# Patient Record
Sex: FEMALE | Race: WHITE | Employment: FULL TIME | URBAN - METROPOLITAN AREA
[De-identification: names, ages, dates, MRNs, and addresses within clinical notes are randomized per-mention and may not be internally consistent; named-entity substitution may affect disease eponyms.]

---

## 2018-02-09 ENCOUNTER — HOSPITAL ENCOUNTER (OUTPATIENT)
Dept: PREADMISSION TESTING | Age: 43
Discharge: HOME | End: 2018-02-09
Payer: MEDICAID

## 2018-02-09 LAB
ABO + RH BLD: NORMAL
APPEARANCE UR: CLEAR
BACTERIA URNS QL MICRO: ABNORMAL /HPF
BASOPHILS # BLD: 0 K/UL (ref 0–0.06)
BASOPHILS NFR BLD: 0 % (ref 0–2)
BILIRUB UR QL: NEGATIVE
BLOOD GROUP ANTIBODIES SERPL: NORMAL
COLOR UR: YELLOW
DIFFERENTIAL METHOD BLD: ABNORMAL
EOSINOPHIL # BLD: 0.1 K/UL (ref 0–0.4)
EOSINOPHIL NFR BLD: 3 % (ref 0–5)
EPITH CASTS URNS QL MICRO: ABNORMAL /LPF (ref 0–5)
ERYTHROCYTE [DISTWIDTH] IN BLOOD BY AUTOMATED COUNT: 12.5 % (ref 11.6–14.5)
GLUCOSE UR STRIP.AUTO-MCNC: NEGATIVE MG/DL
HCT VFR BLD AUTO: 40.8 % (ref 35–45)
HGB BLD-MCNC: 14 G/DL (ref 12–16)
HGB UR QL STRIP: NEGATIVE
KETONES UR QL STRIP.AUTO: NEGATIVE MG/DL
LEUKOCYTE ESTERASE UR QL STRIP.AUTO: ABNORMAL
LYMPHOCYTES # BLD: 1.6 K/UL (ref 0.9–3.6)
LYMPHOCYTES NFR BLD: 40 % (ref 21–52)
MCH RBC QN AUTO: 31.5 PG (ref 24–34)
MCHC RBC AUTO-ENTMCNC: 34.3 G/DL (ref 31–37)
MCV RBC AUTO: 91.9 FL (ref 74–97)
MONOCYTES # BLD: 0.3 K/UL (ref 0.05–1.2)
MONOCYTES NFR BLD: 7 % (ref 3–10)
NEUTS SEG # BLD: 2.1 K/UL (ref 1.8–8)
NEUTS SEG NFR BLD: 50 % (ref 40–73)
NITRITE UR QL STRIP.AUTO: NEGATIVE
PH UR STRIP: 7 [PH] (ref 5–8)
PLATELET # BLD AUTO: 168 K/UL (ref 135–420)
PMV BLD AUTO: 9.8 FL (ref 9.2–11.8)
PROT UR STRIP-MCNC: NEGATIVE MG/DL
RBC # BLD AUTO: 4.44 M/UL (ref 4.2–5.3)
RBC #/AREA URNS HPF: ABNORMAL /HPF (ref 0–5)
SP GR UR REFRACTOMETRY: 1.01 (ref 1–1.03)
SPECIMEN EXP DATE BLD: NORMAL
UROBILINOGEN UR QL STRIP.AUTO: 0.2 EU/DL (ref 0.2–1)
WBC # BLD AUTO: 4 K/UL (ref 4.6–13.2)
WBC URNS QL MICRO: ABNORMAL /HPF (ref 0–5)

## 2018-02-09 PROCEDURE — 85025 COMPLETE CBC W/AUTO DIFF WBC: CPT | Performed by: OBSTETRICS & GYNECOLOGY

## 2018-02-09 PROCEDURE — 36415 COLL VENOUS BLD VENIPUNCTURE: CPT | Performed by: OBSTETRICS & GYNECOLOGY

## 2018-02-09 PROCEDURE — 86900 BLOOD TYPING SEROLOGIC ABO: CPT | Performed by: OBSTETRICS & GYNECOLOGY

## 2018-02-09 PROCEDURE — 81001 URINALYSIS AUTO W/SCOPE: CPT | Performed by: OBSTETRICS & GYNECOLOGY

## 2018-02-09 NOTE — H&P
Formerly [facility] [place]  PRE-OP HISTORY AND PHYSICAL    Michaela Salinas  MR#: [id number]  : 1975  ACCOUNT #: [de-identified]   DATE OF SERVICE: 2018    DATE AND TIME OF THIS DICTATION:  2018, approximately 11:15 a.m. She is coming in at 6:00 a.m. on 2018. CHIEF COMPLAINT:    1. Known lupus patient, stable and controlled. 2.  Dysfunctional uterine bleeding, refractory, dysmenorrhea and dyspareunia, known stage II endometriosis. HISTORY OF PRESENT ILLNESS:  The patient is a 46-year-old  4, para 2, aborta 2,  3  female with the above diagnosis. The patient previously has had diagnostic and operative laparoscopy and was preoperatively cleared as we have this time for 2016 wherein the patient was found to have stage III pelvic uterine ovary endometriosis and a fibroid uterus minimally enlarged. Pathology on the D and C specimens were all benign and as far as the uterus, it is just generally enlarged uterus. There is no sequential increase in size. The patient evaluations of all findings have all been benign and no irregularities seen on the uterus. As mentioned, the D and C specimens have all been benign. Patient has 11 cm generally enlarged uterus, but otherwise normal.  Both ovaries had superficial endometriosis taken down with the lasers and the posterior cul-de-sac endometriosis was also vaporized with laser. The patient; however, has found that she is returning to dysfunctional bleeding.   Workup at [address] [place] [address] shows her platelet counts have been totally normal.  I have talked with Dr. Berenice Byrd of [address] [place] [address] and special hematology, feels all her clotting factors and platelet counts are all normal, just with difficult sticks may cascade her vascular system which gives us a low platelet count as in their facility all platelets have come back normal.  All workups and clotting factors have also been normal.  We have also had the patient seen by Falguni Escudero, her PCP and has cleared the patient with November's evaluation as far as surgery. The patient follows her lupus with Dr. Shai Bauer who feels that she is cleared for surgery, is on Benlysta and gives her shots once a week and does not interfere with the patient's clotting factors or surgery. The patient is also on Plaquenil and it is felt by Dr. Will Evans that there is no need to stop and this will not interfere with the patient's surgical procedures or clotting factors. She was on these medications or at least one of these medicines Plaquenil at the time of her surgery in 2016. The Patient will begin on this time on Ancef, taken at prophylaxis. The patient will also most likely have removal of the left ovary and repairs to the right ovary with ovary plasty and a laparoscopic supracervical hysterectomy. We discussed the issues of the controversy over morselization of the uterus. We will also have the special permit signed but we have no anticipated irregularities to the uterus or any of the myometrial tissues. All had been consented with and patient does improve with utilization of a bag system. The patient also at the time of the laparoscopy had an anteflexed appendix was normal.  Gallbladder site was clear and clean and liver was normal.  The patient has listed in the past Melvi Valencia as her significant and attends her surgeries. OTHER SIGNIFICANT PAST MEDICAL HISTORY:  Previous history of diagnostic and operative laparoscopy with treatment of endometriosis in . The patient has a history of advance and delivery in  and a  as listed with her last pregnancy with the bladder flap scarring minimal at the time of her laparoscopy. The patient has NO KNOWN DRUG ALLERGIES. Occasionally, has a UTI not a concurrent problem. Patient's grandmother had lung cancer, other specifics not given.     The patient's evaluation with Dr. Shai Bauer has shown all renal studies to be normal and as mentioned has placed the patient on Benlysta which she gives as an injection once a week and is on Plaquenil, which has been cleared for her laparoscopy procedures. She is on 200 mg daily. Patient is on vitamin D about 1000 mg per day. Takes occasional Xanax 0.25 mg on a p.r.n. basis. Patient occasionally uses tramadol. Patient's Pap smears are all class 1 and HPV negative for greater than the last three Pap smears including 01/2018. REVIEW OF SYSTEMS:  Otherwise, noncontributory and all are normal except for given above. PHYSICAL EXAMINATION:  GENERAL:  Well-developed, well-nourished  female, 5 feet 8 inches tall. VITAL SIGNS:  Weight 199 pounds, blood pressure 131/80. HEAD,  EYES, EARS, NOSE AND THROATL:  Examination is normal.  CHEST:  Clear. BREASTS:  Without extraneous masses. HEART:  Regular sinus rhythm without murmur. ABDOMEN:  Reveals well healed laparoscopy and Pfannenstiel incision site. PELVIC:  As in the present illness.   The rest of the exam including neurologic is otherwise normal.      MD TALIA Dobson / ANNA  D: 02/06/2018 11:31     T: 02/06/2018 12:31  JOB #: [id number]

## 2018-02-13 ENCOUNTER — HOSPITAL ENCOUNTER (OUTPATIENT)
Dept: PREADMISSION TESTING | Age: 43
Discharge: HOME | End: 2018-02-13
Attending: OBSTETRICS & GYNECOLOGY
Payer: MEDICAID

## 2018-02-13 PROBLEM — N94.5 SECONDARY DYSMENORRHEA: Status: ACTIVE | Noted: 2018-02-13

## 2018-02-13 PROBLEM — N93.8 DUB (DYSFUNCTIONAL UTERINE BLEEDING): Status: ACTIVE | Noted: 2018-02-13

## 2018-02-13 LAB
ANION GAP SERPL CALC-SCNC: 9 MMOL/L (ref 3–18)
ATRIAL RATE: 97 BPM
BUN SERPL-MCNC: 6 MG/DL (ref 7–18)
BUN/CREAT SERPL: 8 (ref 12–20)
CALCIUM SERPL-MCNC: 8.9 MG/DL (ref 8.5–10.1)
CALCULATED P AXIS, ECG09: 61 DEGREES
CALCULATED R AXIS, ECG10: 59 DEGREES
CALCULATED T AXIS, ECG11: 55 DEGREES
CHLORIDE SERPL-SCNC: 108 MMOL/L (ref 100–108)
CO2 SERPL-SCNC: 28 MMOL/L (ref 21–32)
CREAT SERPL-MCNC: 0.8 MG/DL (ref 0.6–1.3)
DIAGNOSIS, 93000: NORMAL
GLUCOSE SERPL-MCNC: 82 MG/DL (ref 74–99)
P-R INTERVAL, ECG05: 164 MS
POTASSIUM SERPL-SCNC: 3.9 MMOL/L (ref 3.5–5.5)
Q-T INTERVAL, ECG07: 338 MS
QRS DURATION, ECG06: 80 MS
QTC CALCULATION (BEZET), ECG08: 429 MS
SODIUM SERPL-SCNC: 145 MMOL/L (ref 136–145)
VENTRICULAR RATE, ECG03: 97 BPM

## 2018-02-13 PROCEDURE — 80048 BASIC METABOLIC PNL TOTAL CA: CPT | Performed by: ANESTHESIOLOGY

## 2018-02-13 PROCEDURE — 93005 ELECTROCARDIOGRAM TRACING: CPT

## 2018-02-13 PROCEDURE — 36415 COLL VENOUS BLD VENIPUNCTURE: CPT | Performed by: ANESTHESIOLOGY

## 2018-02-13 NOTE — INTERVAL H&P NOTE
H&P Update: Cabrera Moreno was seen and examined. History and physical has been reviewed. The patient has been examined.  There have been no significant clinical changes since the completion of the originally dated History and Physical.    Signed By: Inessa Barker MD     February 13, 2018 10:26 AM

## 2018-02-14 ENCOUNTER — ANESTHESIA EVENT (OUTPATIENT)
Dept: SURGERY | Age: 43
End: 2018-02-14
Payer: MEDICAID

## 2018-02-14 ENCOUNTER — ANESTHESIA (OUTPATIENT)
Dept: SURGERY | Age: 43
End: 2018-02-14
Payer: MEDICAID

## 2018-02-14 ENCOUNTER — HOSPITAL ENCOUNTER (OUTPATIENT)
Age: 43
Setting detail: OBSERVATION
Discharge: HOME | End: 2018-02-15
Attending: OBSTETRICS & GYNECOLOGY | Admitting: OBSTETRICS & GYNECOLOGY
Payer: MEDICAID

## 2018-02-14 PROBLEM — N94.5 SECONDARY DYSMENORRHEA: Status: RESOLVED | Noted: 2018-02-13 | Resolved: 2018-02-14

## 2018-02-14 PROBLEM — N93.8 DUB (DYSFUNCTIONAL UTERINE BLEEDING): Status: RESOLVED | Noted: 2018-02-13 | Resolved: 2018-02-14

## 2018-02-14 PROBLEM — Z90.710 S/P HYSTERECTOMY: Status: ACTIVE | Noted: 2018-02-14

## 2018-02-14 PROBLEM — M35.00 SJOGREN'S DISEASE (HCC): Status: ACTIVE | Noted: 2018-02-14

## 2018-02-14 LAB
HCG SERPL QL: NEGATIVE
RPR SER QL: NONREACTIVE

## 2018-02-14 PROCEDURE — 76060000036 HC ANESTHESIA 2.5 TO 3 HR: Performed by: OBSTETRICS & GYNECOLOGY

## 2018-02-14 PROCEDURE — 77030008683 HC TU ET CUF COVD -A: Performed by: ANESTHESIOLOGY

## 2018-02-14 PROCEDURE — 86592 SYPHILIS TEST NON-TREP QUAL: CPT | Performed by: OBSTETRICS & GYNECOLOGY

## 2018-02-14 PROCEDURE — 77030010030: Performed by: OBSTETRICS & GYNECOLOGY

## 2018-02-14 PROCEDURE — 74011250636 HC RX REV CODE- 250/636: Performed by: ANESTHESIOLOGY

## 2018-02-14 PROCEDURE — 74011250637 HC RX REV CODE- 250/637: Performed by: OBSTETRICS & GYNECOLOGY

## 2018-02-14 PROCEDURE — 77030026092 HC FCPS ENDOSC BPLR OCOA -E: Performed by: OBSTETRICS & GYNECOLOGY

## 2018-02-14 PROCEDURE — 88331 PATH CONSLTJ SURG 1 BLK 1SPC: CPT | Performed by: OBSTETRICS & GYNECOLOGY

## 2018-02-14 PROCEDURE — 77030032490 HC SLV COMPR SCD KNE COVD -B: Performed by: OBSTETRICS & GYNECOLOGY

## 2018-02-14 PROCEDURE — 74011250636 HC RX REV CODE- 250/636: Performed by: OBSTETRICS & GYNECOLOGY

## 2018-02-14 PROCEDURE — 74011250636 HC RX REV CODE- 250/636

## 2018-02-14 PROCEDURE — 74011000250 HC RX REV CODE- 250: Performed by: OBSTETRICS & GYNECOLOGY

## 2018-02-14 PROCEDURE — C1782 MORCELLATOR: HCPCS | Performed by: OBSTETRICS & GYNECOLOGY

## 2018-02-14 PROCEDURE — 36415 COLL VENOUS BLD VENIPUNCTURE: CPT | Performed by: OBSTETRICS & GYNECOLOGY

## 2018-02-14 PROCEDURE — 99218 HC RM OBSERVATION: CPT

## 2018-02-14 PROCEDURE — 77030011294 HC FCPS BPLR MCR J&J -B: Performed by: OBSTETRICS & GYNECOLOGY

## 2018-02-14 PROCEDURE — 77030009401: Performed by: OBSTETRICS & GYNECOLOGY

## 2018-02-14 PROCEDURE — 76210000006 HC OR PH I REC 0.5 TO 1 HR: Performed by: OBSTETRICS & GYNECOLOGY

## 2018-02-14 PROCEDURE — 77030033200 HC PRT CLSR CRTR THOMP COOP -C: Performed by: OBSTETRICS & GYNECOLOGY

## 2018-02-14 PROCEDURE — 88307 TISSUE EXAM BY PATHOLOGIST: CPT | Performed by: OBSTETRICS & GYNECOLOGY

## 2018-02-14 PROCEDURE — 77030020268 HC MISC GENERAL SUPPLY: Performed by: OBSTETRICS & GYNECOLOGY

## 2018-02-14 PROCEDURE — 77030031139 HC SUT VCRL2 J&J -A: Performed by: OBSTETRICS & GYNECOLOGY

## 2018-02-14 PROCEDURE — 77030003666 HC NDL SPINAL BD -A: Performed by: OBSTETRICS & GYNECOLOGY

## 2018-02-14 PROCEDURE — 74011000250 HC RX REV CODE- 250

## 2018-02-14 PROCEDURE — C1765 ADHESION BARRIER: HCPCS | Performed by: OBSTETRICS & GYNECOLOGY

## 2018-02-14 PROCEDURE — 77030020782 HC GWN BAIR PAWS FLX 3M -B: Performed by: OBSTETRICS & GYNECOLOGY

## 2018-02-14 PROCEDURE — 77030020255 HC SOL INJ LR 1000ML BG: Performed by: OBSTETRICS & GYNECOLOGY

## 2018-02-14 PROCEDURE — 77030013079 HC BLNKT BAIR HGGR 3M -A: Performed by: ANESTHESIOLOGY

## 2018-02-14 PROCEDURE — 77030018836 HC SOL IRR NACL ICUM -A: Performed by: OBSTETRICS & GYNECOLOGY

## 2018-02-14 PROCEDURE — 77030009851 HC PCH RTVR ENDOSC AMR -B: Performed by: OBSTETRICS & GYNECOLOGY

## 2018-02-14 PROCEDURE — 77010033678 HC OXYGEN DAILY

## 2018-02-14 PROCEDURE — 76010000132 HC OR TIME 2.5 TO 3 HR: Performed by: OBSTETRICS & GYNECOLOGY

## 2018-02-14 PROCEDURE — 84703 CHORIONIC GONADOTROPIN ASSAY: CPT | Performed by: OBSTETRICS & GYNECOLOGY

## 2018-02-14 PROCEDURE — 77030008603 HC TRCR ENDOSC EPATH J&J -C: Performed by: OBSTETRICS & GYNECOLOGY

## 2018-02-14 PROCEDURE — 77030008477 HC STYL SATN SLP COVD -A: Performed by: ANESTHESIOLOGY

## 2018-02-14 PROCEDURE — 88305 TISSUE EXAM BY PATHOLOGIST: CPT | Performed by: OBSTETRICS & GYNECOLOGY

## 2018-02-14 PROCEDURE — 77030027138 HC INCENT SPIROMETER -A

## 2018-02-14 RX ORDER — SCOLOPAMINE TRANSDERMAL SYSTEM 1 MG/1
PATCH, EXTENDED RELEASE TRANSDERMAL AS NEEDED
Status: DISCONTINUED | OUTPATIENT
Start: 2018-02-14 | End: 2018-02-14 | Stop reason: HOSPADM

## 2018-02-14 RX ORDER — SODIUM CHLORIDE 0.9 % (FLUSH) 0.9 %
5-10 SYRINGE (ML) INJECTION EVERY 8 HOURS
Status: DISCONTINUED | OUTPATIENT
Start: 2018-02-14 | End: 2018-02-15 | Stop reason: HOSPADM

## 2018-02-14 RX ORDER — ROCURONIUM BROMIDE 10 MG/ML
INJECTION, SOLUTION INTRAVENOUS AS NEEDED
Status: DISCONTINUED | OUTPATIENT
Start: 2018-02-14 | End: 2018-02-14 | Stop reason: HOSPADM

## 2018-02-14 RX ORDER — FENTANYL CITRATE 50 UG/ML
INJECTION, SOLUTION INTRAMUSCULAR; INTRAVENOUS AS NEEDED
Status: DISCONTINUED | OUTPATIENT
Start: 2018-02-14 | End: 2018-02-14 | Stop reason: HOSPADM

## 2018-02-14 RX ORDER — MIDAZOLAM HYDROCHLORIDE 1 MG/ML
INJECTION, SOLUTION INTRAMUSCULAR; INTRAVENOUS AS NEEDED
Status: DISCONTINUED | OUTPATIENT
Start: 2018-02-14 | End: 2018-02-14 | Stop reason: HOSPADM

## 2018-02-14 RX ORDER — DEXTROSE 50 % IN WATER (D50W) INTRAVENOUS SYRINGE
25-50 AS NEEDED
Status: DISCONTINUED | OUTPATIENT
Start: 2018-02-14 | End: 2018-02-14 | Stop reason: HOSPADM

## 2018-02-14 RX ORDER — GLYCOPYRROLATE 0.2 MG/ML
INJECTION INTRAMUSCULAR; INTRAVENOUS AS NEEDED
Status: DISCONTINUED | OUTPATIENT
Start: 2018-02-14 | End: 2018-02-14 | Stop reason: HOSPADM

## 2018-02-14 RX ORDER — ZOLPIDEM TARTRATE 5 MG/1
5 TABLET ORAL
Status: DISCONTINUED | OUTPATIENT
Start: 2018-02-14 | End: 2018-02-15 | Stop reason: HOSPADM

## 2018-02-14 RX ORDER — PROPOFOL 10 MG/ML
INJECTION, EMULSION INTRAVENOUS AS NEEDED
Status: DISCONTINUED | OUTPATIENT
Start: 2018-02-14 | End: 2018-02-14 | Stop reason: HOSPADM

## 2018-02-14 RX ORDER — DIPHENHYDRAMINE HYDROCHLORIDE 50 MG/ML
12.5 INJECTION, SOLUTION INTRAMUSCULAR; INTRAVENOUS
Status: DISCONTINUED | OUTPATIENT
Start: 2018-02-14 | End: 2018-02-15 | Stop reason: HOSPADM

## 2018-02-14 RX ORDER — SODIUM CHLORIDE 0.9 % (FLUSH) 0.9 %
5-10 SYRINGE (ML) INJECTION AS NEEDED
Status: DISCONTINUED | OUTPATIENT
Start: 2018-02-14 | End: 2018-02-15 | Stop reason: HOSPADM

## 2018-02-14 RX ORDER — HYDROCODONE BITARTRATE AND ACETAMINOPHEN 5; 325 MG/1; MG/1
1 TABLET ORAL AS NEEDED
Status: DISCONTINUED | OUTPATIENT
Start: 2018-02-14 | End: 2018-02-14 | Stop reason: HOSPADM

## 2018-02-14 RX ORDER — SODIUM CHLORIDE, SODIUM LACTATE, POTASSIUM CHLORIDE, CALCIUM CHLORIDE 600; 310; 30; 20 MG/100ML; MG/100ML; MG/100ML; MG/100ML
125 INJECTION, SOLUTION INTRAVENOUS CONTINUOUS
Status: DISCONTINUED | OUTPATIENT
Start: 2018-02-14 | End: 2018-02-15

## 2018-02-14 RX ORDER — ONDANSETRON 2 MG/ML
INJECTION INTRAMUSCULAR; INTRAVENOUS AS NEEDED
Status: DISCONTINUED | OUTPATIENT
Start: 2018-02-14 | End: 2018-02-14 | Stop reason: HOSPADM

## 2018-02-14 RX ORDER — ALBUTEROL SULFATE 0.83 MG/ML
2.5 SOLUTION RESPIRATORY (INHALATION) AS NEEDED
Status: DISCONTINUED | OUTPATIENT
Start: 2018-02-14 | End: 2018-02-14 | Stop reason: HOSPADM

## 2018-02-14 RX ORDER — DEXMEDETOMIDINE HYDROCHLORIDE 4 UG/ML
INJECTION, SOLUTION INTRAVENOUS AS NEEDED
Status: DISCONTINUED | OUTPATIENT
Start: 2018-02-14 | End: 2018-02-14 | Stop reason: HOSPADM

## 2018-02-14 RX ORDER — DIPHENHYDRAMINE HCL 25 MG
25 CAPSULE ORAL
Status: DISCONTINUED | OUTPATIENT
Start: 2018-02-14 | End: 2018-02-15 | Stop reason: HOSPADM

## 2018-02-14 RX ORDER — DIPHENHYDRAMINE HYDROCHLORIDE 50 MG/ML
INJECTION, SOLUTION INTRAMUSCULAR; INTRAVENOUS AS NEEDED
Status: DISCONTINUED | OUTPATIENT
Start: 2018-02-14 | End: 2018-02-14 | Stop reason: HOSPADM

## 2018-02-14 RX ORDER — SODIUM CHLORIDE, SODIUM LACTATE, POTASSIUM CHLORIDE, CALCIUM CHLORIDE 600; 310; 30; 20 MG/100ML; MG/100ML; MG/100ML; MG/100ML
150 INJECTION, SOLUTION INTRAVENOUS CONTINUOUS
Status: DISCONTINUED | OUTPATIENT
Start: 2018-02-14 | End: 2018-02-14 | Stop reason: HOSPADM

## 2018-02-14 RX ORDER — INSULIN LISPRO 100 [IU]/ML
INJECTION, SOLUTION INTRAVENOUS; SUBCUTANEOUS ONCE
Status: DISCONTINUED | OUTPATIENT
Start: 2018-02-14 | End: 2018-02-14 | Stop reason: HOSPADM

## 2018-02-14 RX ORDER — CEFAZOLIN SODIUM 2 G/50ML
2 SOLUTION INTRAVENOUS EVERY 6 HOURS
Status: COMPLETED | OUTPATIENT
Start: 2018-02-14 | End: 2018-02-15

## 2018-02-14 RX ORDER — NALOXONE HYDROCHLORIDE 0.4 MG/ML
0.4 INJECTION, SOLUTION INTRAMUSCULAR; INTRAVENOUS; SUBCUTANEOUS AS NEEDED
Status: DISCONTINUED | OUTPATIENT
Start: 2018-02-14 | End: 2018-02-15 | Stop reason: HOSPADM

## 2018-02-14 RX ORDER — PANTOPRAZOLE SODIUM 40 MG/1
40 TABLET, DELAYED RELEASE ORAL
Status: DISCONTINUED | OUTPATIENT
Start: 2018-02-14 | End: 2018-02-15 | Stop reason: HOSPADM

## 2018-02-14 RX ORDER — HYDROMORPHONE HYDROCHLORIDE 2 MG/ML
0.5 INJECTION, SOLUTION INTRAMUSCULAR; INTRAVENOUS; SUBCUTANEOUS
Status: DISCONTINUED | OUTPATIENT
Start: 2018-02-14 | End: 2018-02-14 | Stop reason: HOSPADM

## 2018-02-14 RX ORDER — DIPHENHYDRAMINE HYDROCHLORIDE 50 MG/ML
12.5 INJECTION, SOLUTION INTRAMUSCULAR; INTRAVENOUS
Status: DISCONTINUED | OUTPATIENT
Start: 2018-02-14 | End: 2018-02-14 | Stop reason: HOSPADM

## 2018-02-14 RX ORDER — MAGNESIUM SULFATE 100 %
4 CRYSTALS MISCELLANEOUS AS NEEDED
Status: DISCONTINUED | OUTPATIENT
Start: 2018-02-14 | End: 2018-02-14 | Stop reason: HOSPADM

## 2018-02-14 RX ORDER — LIDOCAINE HYDROCHLORIDE 20 MG/ML
INJECTION, SOLUTION EPIDURAL; INFILTRATION; INTRACAUDAL; PERINEURAL AS NEEDED
Status: DISCONTINUED | OUTPATIENT
Start: 2018-02-14 | End: 2018-02-14 | Stop reason: HOSPADM

## 2018-02-14 RX ORDER — DEXAMETHASONE SODIUM PHOSPHATE 4 MG/ML
INJECTION, SOLUTION INTRA-ARTICULAR; INTRALESIONAL; INTRAMUSCULAR; INTRAVENOUS; SOFT TISSUE AS NEEDED
Status: DISCONTINUED | OUTPATIENT
Start: 2018-02-14 | End: 2018-02-14 | Stop reason: HOSPADM

## 2018-02-14 RX ORDER — KETOROLAC TROMETHAMINE 30 MG/ML
INJECTION, SOLUTION INTRAMUSCULAR; INTRAVENOUS AS NEEDED
Status: DISCONTINUED | OUTPATIENT
Start: 2018-02-14 | End: 2018-02-14 | Stop reason: HOSPADM

## 2018-02-14 RX ORDER — ONDANSETRON 2 MG/ML
4 INJECTION INTRAMUSCULAR; INTRAVENOUS ONCE
Status: DISCONTINUED | OUTPATIENT
Start: 2018-02-14 | End: 2018-02-14 | Stop reason: HOSPADM

## 2018-02-14 RX ORDER — NALOXONE HYDROCHLORIDE 0.4 MG/ML
0.1 INJECTION, SOLUTION INTRAMUSCULAR; INTRAVENOUS; SUBCUTANEOUS AS NEEDED
Status: DISCONTINUED | OUTPATIENT
Start: 2018-02-14 | End: 2018-02-14 | Stop reason: HOSPADM

## 2018-02-14 RX ORDER — BUPIVACAINE HYDROCHLORIDE 2.5 MG/ML
INJECTION, SOLUTION EPIDURAL; INFILTRATION; INTRACAUDAL AS NEEDED
Status: DISCONTINUED | OUTPATIENT
Start: 2018-02-14 | End: 2018-02-14 | Stop reason: HOSPADM

## 2018-02-14 RX ORDER — ALPRAZOLAM 0.5 MG/1
0.25 TABLET ORAL
Status: DISCONTINUED | OUTPATIENT
Start: 2018-02-14 | End: 2018-02-15 | Stop reason: HOSPADM

## 2018-02-14 RX ORDER — NALOXONE HYDROCHLORIDE 0.4 MG/ML
0.1 INJECTION, SOLUTION INTRAMUSCULAR; INTRAVENOUS; SUBCUTANEOUS AS NEEDED
Status: DISCONTINUED | OUTPATIENT
Start: 2018-02-14 | End: 2018-02-15 | Stop reason: HOSPADM

## 2018-02-14 RX ORDER — KETOROLAC TROMETHAMINE 30 MG/ML
30 INJECTION, SOLUTION INTRAMUSCULAR; INTRAVENOUS
Status: DISCONTINUED | OUTPATIENT
Start: 2018-02-14 | End: 2018-02-15 | Stop reason: HOSPADM

## 2018-02-14 RX ORDER — NEOSTIGMINE METHYLSULFATE 5 MG/5 ML
SYRINGE (ML) INTRAVENOUS AS NEEDED
Status: DISCONTINUED | OUTPATIENT
Start: 2018-02-14 | End: 2018-02-14 | Stop reason: HOSPADM

## 2018-02-14 RX ORDER — ACETAMINOPHEN 10 MG/ML
1000 INJECTION, SOLUTION INTRAVENOUS ONCE
Status: COMPLETED | OUTPATIENT
Start: 2018-02-14 | End: 2018-02-14

## 2018-02-14 RX ORDER — SODIUM CHLORIDE 9 MG/ML
1000 INJECTION, SOLUTION INTRAVENOUS CONTINUOUS
Status: DISCONTINUED | OUTPATIENT
Start: 2018-02-14 | End: 2018-02-15

## 2018-02-14 RX ORDER — SODIUM CHLORIDE 0.9 % (FLUSH) 0.9 %
5-10 SYRINGE (ML) INJECTION AS NEEDED
Status: DISCONTINUED | OUTPATIENT
Start: 2018-02-14 | End: 2018-02-14 | Stop reason: HOSPADM

## 2018-02-14 RX ORDER — ONDANSETRON 2 MG/ML
4 INJECTION INTRAMUSCULAR; INTRAVENOUS AS NEEDED
Status: COMPLETED | OUTPATIENT
Start: 2018-02-14 | End: 2018-02-14

## 2018-02-14 RX ORDER — SUCCINYLCHOLINE CHLORIDE 20 MG/ML
INJECTION INTRAMUSCULAR; INTRAVENOUS AS NEEDED
Status: DISCONTINUED | OUTPATIENT
Start: 2018-02-14 | End: 2018-02-14 | Stop reason: HOSPADM

## 2018-02-14 RX ORDER — CEFAZOLIN SODIUM 2 G/50ML
2 SOLUTION INTRAVENOUS ONCE
Status: COMPLETED | OUTPATIENT
Start: 2018-02-14 | End: 2018-02-14

## 2018-02-14 RX ADMIN — SODIUM CHLORIDE 1000 ML: 900 INJECTION, SOLUTION INTRAVENOUS at 13:11

## 2018-02-14 RX ADMIN — ROCURONIUM BROMIDE 5 MG: 10 INJECTION, SOLUTION INTRAVENOUS at 08:23

## 2018-02-14 RX ADMIN — FENTANYL CITRATE 50 MCG: 50 INJECTION, SOLUTION INTRAMUSCULAR; INTRAVENOUS at 09:59

## 2018-02-14 RX ADMIN — FENTANYL CITRATE 50 MCG: 50 INJECTION, SOLUTION INTRAMUSCULAR; INTRAVENOUS at 09:15

## 2018-02-14 RX ADMIN — HYDROMORPHONE HYDROCHLORIDE: 10 INJECTION, SOLUTION INTRAMUSCULAR; INTRAVENOUS; SUBCUTANEOUS at 11:50

## 2018-02-14 RX ADMIN — CEFAZOLIN SODIUM 2 G: 2 SOLUTION INTRAVENOUS at 15:18

## 2018-02-14 RX ADMIN — FENTANYL CITRATE 50 MCG: 50 INJECTION, SOLUTION INTRAMUSCULAR; INTRAVENOUS at 10:51

## 2018-02-14 RX ADMIN — DEXMEDETOMIDINE HYDROCHLORIDE 8 MCG: 4 INJECTION, SOLUTION INTRAVENOUS at 09:59

## 2018-02-14 RX ADMIN — FENTANYL CITRATE 50 MCG: 50 INJECTION, SOLUTION INTRAMUSCULAR; INTRAVENOUS at 08:47

## 2018-02-14 RX ADMIN — DEXAMETHASONE SODIUM PHOSPHATE 8 MG: 4 INJECTION, SOLUTION INTRA-ARTICULAR; INTRALESIONAL; INTRAMUSCULAR; INTRAVENOUS; SOFT TISSUE at 08:40

## 2018-02-14 RX ADMIN — ACETAMINOPHEN 1000 MG: 10 INJECTION, SOLUTION INTRAVENOUS at 07:55

## 2018-02-14 RX ADMIN — ONDANSETRON 4 MG: 2 INJECTION INTRAMUSCULAR; INTRAVENOUS at 15:56

## 2018-02-14 RX ADMIN — CEFAZOLIN SODIUM 2 G: 2 SOLUTION INTRAVENOUS at 19:44

## 2018-02-14 RX ADMIN — DEXMEDETOMIDINE HYDROCHLORIDE 8 MCG: 4 INJECTION, SOLUTION INTRAVENOUS at 09:15

## 2018-02-14 RX ADMIN — PROPOFOL 200 MG: 10 INJECTION, EMULSION INTRAVENOUS at 08:24

## 2018-02-14 RX ADMIN — DEXMEDETOMIDINE HYDROCHLORIDE 8 MCG: 4 INJECTION, SOLUTION INTRAVENOUS at 10:24

## 2018-02-14 RX ADMIN — SCOLOPAMINE TRANSDERMAL SYSTEM 1 PATCH: 1 PATCH, EXTENDED RELEASE TRANSDERMAL at 08:19

## 2018-02-14 RX ADMIN — DEXMEDETOMIDINE HYDROCHLORIDE 8 MCG: 4 INJECTION, SOLUTION INTRAVENOUS at 09:12

## 2018-02-14 RX ADMIN — ROCURONIUM BROMIDE 10 MG: 10 INJECTION, SOLUTION INTRAVENOUS at 10:15

## 2018-02-14 RX ADMIN — LIDOCAINE HYDROCHLORIDE 100 MG: 20 INJECTION, SOLUTION EPIDURAL; INFILTRATION; INTRACAUDAL; PERINEURAL at 11:07

## 2018-02-14 RX ADMIN — SUCCINYLCHOLINE CHLORIDE 100 MG: 20 INJECTION INTRAMUSCULAR; INTRAVENOUS at 08:24

## 2018-02-14 RX ADMIN — SODIUM CHLORIDE, SODIUM LACTATE, POTASSIUM CHLORIDE, AND CALCIUM CHLORIDE 125 ML/HR: 600; 310; 30; 20 INJECTION, SOLUTION INTRAVENOUS at 06:59

## 2018-02-14 RX ADMIN — ONDANSETRON 4 MG: 2 INJECTION INTRAMUSCULAR; INTRAVENOUS at 11:01

## 2018-02-14 RX ADMIN — DEXMEDETOMIDINE HYDROCHLORIDE 8 MCG: 4 INJECTION, SOLUTION INTRAVENOUS at 09:51

## 2018-02-14 RX ADMIN — ONDANSETRON 4 MG: 2 INJECTION INTRAMUSCULAR; INTRAVENOUS at 07:55

## 2018-02-14 RX ADMIN — FENTANYL CITRATE 100 MCG: 50 INJECTION, SOLUTION INTRAMUSCULAR; INTRAVENOUS at 08:24

## 2018-02-14 RX ADMIN — MIDAZOLAM HYDROCHLORIDE 2 MG: 1 INJECTION, SOLUTION INTRAMUSCULAR; INTRAVENOUS at 08:19

## 2018-02-14 RX ADMIN — Medication 3 MG: at 11:00

## 2018-02-14 RX ADMIN — CEFAZOLIN SODIUM 2 G: 2 SOLUTION INTRAVENOUS at 08:30

## 2018-02-14 RX ADMIN — ROCURONIUM BROMIDE 20 MG: 10 INJECTION, SOLUTION INTRAVENOUS at 09:15

## 2018-02-14 RX ADMIN — Medication 10 ML: at 15:18

## 2018-02-14 RX ADMIN — KETOROLAC TROMETHAMINE 30 MG: 30 INJECTION, SOLUTION INTRAMUSCULAR; INTRAVENOUS at 11:01

## 2018-02-14 RX ADMIN — GLYCOPYRROLATE 0.4 MG: 0.2 INJECTION INTRAMUSCULAR; INTRAVENOUS at 11:00

## 2018-02-14 RX ADMIN — ROCURONIUM BROMIDE 45 MG: 10 INJECTION, SOLUTION INTRAVENOUS at 08:30

## 2018-02-14 RX ADMIN — LIDOCAINE HYDROCHLORIDE 100 MG: 20 INJECTION, SOLUTION EPIDURAL; INFILTRATION; INTRACAUDAL; PERINEURAL at 08:24

## 2018-02-14 RX ADMIN — PANTOPRAZOLE SODIUM 40 MG: 40 TABLET, DELAYED RELEASE ORAL at 22:26

## 2018-02-14 RX ADMIN — SODIUM CHLORIDE, SODIUM LACTATE, POTASSIUM CHLORIDE, AND CALCIUM CHLORIDE: 600; 310; 30; 20 INJECTION, SOLUTION INTRAVENOUS at 08:25

## 2018-02-14 RX ADMIN — DIPHENHYDRAMINE HYDROCHLORIDE 12.5 MG: 50 INJECTION, SOLUTION INTRAMUSCULAR; INTRAVENOUS at 11:18

## 2018-02-14 NOTE — IP AVS SNAPSHOT
[address] [address] 
[phone] Patient: May Saldaña MRN: [id number] :1975 About your hospitalization You were admitted on:  2018 You last received care in the:  [address] [place] [address] You were discharged on:  February 15, 2018 Why you were hospitalized Your primary diagnosis was:  Not on File Your diagnoses also included:  Dub (Dysfunctional Uterine Bleeding), Secondary Dysmenorrhea, Sjogren's Disease (Hcc), S/P Hysterectomy Follow-up Information Follow up With Details Comments Contact Info [address] [phone] 
[phone] Adrianna Gaines MD   [facility] [address] [place] [address] 
[phone] Discharge Orders None A check jyoti indicates which time of day the medication should be taken. My Medications CONTINUE taking these medications Instructions Each Dose to Equal  
 Morning Noon Evening Bedtime AMBIEN 10 mg tablet Generic drug:  zolpidem Your last dose was: Your next dose is: Take  by mouth nightly as needed for Sleep. BENLYSTA 200 mg/mL Atin Generic drug:  belimumab Your last dose was: Your next dose is:    
   
   
 200 mg by SubCUTAneous route every . 200 mg  
    
   
   
   
  
 cyclobenzaprine 5 mg tablet Commonly known as:  FLEXERIL Your last dose was: Your next dose is: Take 5 mg by mouth three (3) times daily as needed for Muscle Spasm(s). Indications: MUSCLE SPASM 5 mg IMITREX 50 mg tablet Generic drug:  SUMAtriptan Your last dose was: Your next dose is: Take 50 mg by mouth once as needed for Migraine (May repeat in 2 hours). Indications: Migraine  50 mg  
    
   
   
   
  
 MOTRIN  mg tablet Generic drug:  ibuprofen Your last dose was: Your next dose is: Take 200 mg by mouth every six (6) hours as needed for Pain (moderate to severe). 200 mg NexIUM 20 mg capsule Generic drug:  esomeprazole Your last dose was: Your next dose is: Take 20 mg by mouth nightly. 20 mg  
    
   
   
   
  
 PLAQUENIL 200 mg tablet Generic drug:  hydroxychloroquine Your last dose was: Your next dose is: Take 200 mg by mouth nightly. 200 mg  
    
   
   
   
  
 prenatal multivit-ca-min-fe-fa Tab Your last dose was: Your next dose is: Take  by mouth daily. promethazine 25 mg tablet Commonly known as:  PHENERGAN Your last dose was: Your next dose is: Take 25 mg by mouth every six (6) hours as needed for Nausea. Indications: nausea 25 mg  
    
   
   
   
  
 traMADol 50 mg tablet Commonly known as:  ULTRAM  
   
Your last dose was: Your next dose is: Take 50 mg by mouth every six (6) hours as needed for Pain (moderate to severe pain). 50 mg  
    
   
   
   
  
 VITAMIN D3 1,000 unit tablet Generic drug:  cholecalciferol Your last dose was: Your next dose is: Take 1,000 Units by mouth nightly. 1000 Units XANAX 0.25 mg tablet Generic drug:  ALPRAZolam  
   
Your last dose was: Your next dose is: Take 0.25 mg by mouth daily as needed for Anxiety. Indications: anxiety 0.25 mg Discharge Instructions Laparoscopically Assisted Vaginal Hysterectomy: What to Expect at [facility] Your Recovery You can expect to feel better and stronger each day, although you may need pain medicine for a week or two.  You may get tired easily or have less energy than usual. This may last for several weeks after surgery. You will probably notice that your belly is swollen and puffy. This is common. The swelling will take several weeks to go down. It may take about 4 to 6 weeks to fully recover. The recovery time may be less for some patients. You may have some light vaginal bleeding. Don't have sex until the doctor says it is okay. Don't douche or put anything into your vagina, such as a tampon, until your doctor says it is okay. It is important to avoid lifting while you are recovering so that you can heal. 
This care sheet gives you a general idea about how long it will take for you to recover. But each person recovers at a different pace. Follow the steps below to get better as quickly as possible. How can you care for yourself at home? Activity ? · Rest when you feel tired. Getting enough sleep will help you recover. ? · Try to walk each day. Start by walking a little more than you did the day before. Bit by bit, increase the amount you walk. Walking boosts blood flow and helps prevent pneumonia and constipation. ? · Avoid lifting anything that would make you strain. This may include heavy grocery bags and milk containers, a heavy briefcase or backpack, cat litter or dog food bags, a vacuum , or a child. ? · Avoid strenuous activities, such as biking, jogging, weight lifting, or aerobic exercise, until your doctor says it is okay. ? · You may shower. Pat the cut (incision) dry. Do not take a bath for the first 2 weeks, or until your doctor tells you it is okay. ? · Ask your doctor when you can drive again. ? · You will probably need to take about 2 weeks off from work. It depends on the type of work you do and how you feel. ? · Your doctor will tell you when you can have sex again. Diet ? · You can eat your normal diet. If your stomach is upset, try bland, low-fat foods like plain rice, broiled chicken, toast, and yogurt. ? · Drink plenty of fluids (unless your doctor tells you not to). ? · You may notice that your bowel movements are not regular right after your surgery. This is common. Try to avoid constipation and straining with bowel movements. You may want to take a fiber supplement every day. If you have not had a bowel movement after a couple of days, ask your doctor about taking a mild laxative. Medicines ? · Your doctor will tell you if and when you can restart your medicines. He or she will also give you instructions about taking any new medicines. ? · If you take blood thinners, such as warfarin (Coumadin), clopidogrel (Plavix), or aspirin, be sure to talk to your doctor. He or she will tell you if and when to start taking those medicines again. Make sure that you understand exactly what your doctor wants you to do. ? · Be safe with medicines. Take pain medicines exactly as directed. ¨ If the doctor gave you a prescription medicine for pain, take it as prescribed. ¨ If you are not taking a prescription pain medicine, ask your doctor if you can take an over-the-counter medicine. ? · If you think your pain medicine is making you sick to your stomach: 
¨ Take your medicine after meals (unless your doctor has told you not to). ¨ Ask your doctor for a different pain medicine. ? · If your doctor prescribed antibiotics, take them as directed. Do not stop taking them just because you feel better. You need to take the full course of antibiotics. Incision care ? · You may have stitches over the cuts (incisions) the doctor made in your belly. If you have strips of tape on the incisions the doctor made, leave the tape on for a week or until it falls off. Or follow your doctor's instructions for removing the tape. ? · Wash the area daily with warm, soapy water, and pat it dry. Don't use hydrogen peroxide or alcohol, which can slow healing.  You may cover the area with a gauze bandage if it weeps or rubs against clothing. Change the bandage every day. ? · Keep the area clean and dry. Other instructions ? · You may have some light vaginal bleeding. Wear sanitary pads if needed. Do not douche or use tampons. Follow-up care is a key part of your treatment and safety. Be sure to make and go to all appointments, and call your doctor if you are having problems. It's also a good idea to know your test results and keep a list of the medicines you take. When should you call for help? Call 911 anytime you think you may need emergency care. For example, call if: 
? · You passed out (lost consciousness). ? · You have chest pain, are short of breath, or cough up blood. ?Call your doctor now or seek immediate medical care if: 
? · You have pain that does not get better after you take pain medicine. ? · You cannot pass stools or gas. ? · You have vaginal discharge that has increased in amount or smells bad.  
? · You are sick to your stomach or cannot drink fluids. ? · You have loose stitches, or your incision comes open. ? · Bright red blood has soaked through the bandage over your incision. ? · You have signs of infection, such as: 
¨ Increased pain, swelling, warmth, or redness. ¨ Red streaks leading from the incision. ¨ Pus draining from the incision. ¨ A fever. ? · You have bright red vaginal bleeding that soaks one or more pads in an hour, or you have large clots. ? · You have signs of a blood clot in your leg (called a deep vein thrombosis), such as: 
¨ Pain in your calf, back of the knee, thigh, or groin. ¨ Redness and swelling in your leg. ? Watch closely for changes in your health, and be sure to contact your doctor if you have any problems. Where can you learn more? Go to http://mikki-crescencio.info/. Enter C664 in the search box to learn more about \"Laparoscopically Assisted Vaginal Hysterectomy: What to Expect at Home. \" 
 Current as of: October 13, 2016 Content Version: 11.4 © 5054-2242 [employer]. Care instructions adapted under license by [employer] (which disclaims liability or warranty for this information). If you have questions about a medical condition or this instruction, always ask your healthcare professional. [address] any warranty or liability for your use of this information. [email or web address] Announcement We are excited to announce that we are making your provider's discharge notes available to you in [email or web address]. You will see these notes when they are completed and signed by the physician that discharged you from your recent hospital stay. If you have any questions or concerns about any information you see in [email or web address], please call the Health Information Department where you were seen or reach out to your Primary Care Provider for more information about your plan of care. Introducing [facility]! [employer] introduces [email or web address] patient portal. Now you can access parts of your medical record, email your doctor's office, and request medication refills online. 1. In your internet browser, go to [email or web address] 2. Click on the First Time User? Click Here link in the Sign In box. You will see the New Member Sign Up page. 3. Enter your [email or web address] Access Code exactly as it appears below. You will not need to use this code after youve completed the sign-up process. If you do not sign up before the expiration date, you must request a new code. · [email or web address] Access Code: [email or web address] Expires: 2/24/2018 12:19 PM 
 
4. Enter the last four digits of your Social Security Number (xxxx) and Date of Birth (mm/dd/yyyy) as indicated and click Submit. You will be taken to the next sign-up page. 5. Create a [email or web address] ID. This will be your [email or web address] login ID and cannot be changed, so think of one that is secure and easy to remember. 6. Create a [employer] password. You can change your password at any time. 7. Enter your Password Reset Question and Answer. This can be used at a later time if you forget your password. 8. Enter your e-mail address. You will receive e-mail notification when new information is available in [address] 9. Click Sign Up. You can now view and download portions of your medical record. 10. Click the Download Summary menu link to download a portable copy of your medical information. If you have questions, please visit the Frequently Asked Questions section of the [employer] website. Remember, [employer] is NOT to be used for urgent needs. For medical emergencies, dial 911. Now available from your iPhone and Android! Providers Seen During Your Hospitalization Provider Specialty Primary office phone Trey Mejia MD Obstetrics & Gynecology [phone] Immunizations Administered for This Admission Name Date Influenza Vaccine (Quad) PF 2/15/2018 Your Primary Care Physician (PCP) Primary Care Physician Office Phone Office Fax Naye Simpson [phone] You are allergic to the following No active allergies Recent Documentation Height Weight Breastfeeding? BMI OB Status Smoking Status 1.702 m 91 kg No 31.41 kg/m2 Having regular periods Current Every Day Smoker Emergency Contacts Name Discharge Info Relation Home Work Mobile Mehran Jain DISCHARGE CAREGIVER [3] Boyfriend [17]   [phone] Cherelle Driscoll DISCHARGE CAREGIVER [3] Mother [14] [phone] Patient Belongings The following personal items are in your possession at time of discharge: 
  Dental Appliances: None  Visual Aid: None      Home Medications: None   Jewelry: None  Clothing: None    Other Valuables: Cell Phone Please provide this summary of care documentation to your next provider. Signatures-by signing, you are acknowledging that this After Visit Summary has been reviewed with you and you have received a copy. Patient Signature:  ____________________________________________________________ Date:  ____________________________________________________________  
  
Delmar November Provider Signature:  ____________________________________________________________ Date:  ____________________________________________________________

## 2018-02-14 NOTE — PROGRESS NOTES
NUTRITION SCREENING    Recommendations: Adv diet per MD; avoid prolonged Clear Liquid diet order    RD ASSESSMENT/PLAN:     Diet:  Clear Liquid Height: 5' 7\" (170.2 cm)     Food Allergies: NKFA  Weight: 91 kg (200 lb 9 oz)    PO Intake:  No data found. BMI: 31.4 kg/m^2 is  obese (30%-39.9% BMI)      PMH: lupus, endometriosis    Current Hospital Problems: Pt admitted fo surgery for stage 2 endometriosis, left ovary, and hemorrhagic cyst.       Nutrition intervention not currently indicated. Pt is not at nutritional risk at this time. Will rescreen per policy.      REASON FOR ASSESSMENT:   [x]  RN Referral:    [x] MST score >/=2  Malnutrition Screening Tool (MST):  Recently Lost Weight Without Trying: No  If Yes, How Much Weight Loss: Unsure     MST Score: [address]  Pager: [phone]

## 2018-02-14 NOTE — INTERVAL H&P NOTE
H&P Update: Malick Shen was seen and examined. History and physical has been reviewed. The patient has been examined.  There have been no significant clinical changes since the completion of the originally dated History and Physical.    Signed By: Chi Brennan MD     February 14, 2018 7:04 AM

## 2018-02-14 NOTE — OP NOTES
Operative Note      Patient: Charline Barthel               Sex: female          DOA: 2/14/2018         YOB: 1975      Age:  40 y.o.        LOS:  LOS: 0 days     Preoperative Diagnosis: DUB, PELVIC PAIN, STAGE 2 ENDOMETRIOSIS    Postoperative Diagnosis:  DUB, PELVIC PAIN, STAGE 2 ENDOMETRIOSIS, LEFT OVARY, HEMORRHAGIC CYST    Surgeon: Surgeon(s) and Role:     * Caity Pruitt MD - Primary    Anesthesia:  General    Estimated Blood Loss:  50cc    Estimated Blood Replacement:  0    Estimated Fluid Absorption: 0    Procedure:  Procedure(s):  LAPARSCOPIC SUPRACERVICAL HYSTERECTOMY AND LEFT SALPINGO OOPHORECTOMY , RIGHT OVARYPLASTY     Procedure in Detail: See dictation ticket #[id number]    Findings:    Uterus:rv 11 cm  lsh done   Tubes:    Right: btl sites nml      Left jagruti   Ovaries:      Right: pco red endo laser  cleared    Left: 4 cm hemorhagic cyst b9  ls and o done path b9   Appi:  Ant nml   GB Head: choli area clean    Liver: nml   ABD: clear   Pelvis: jagruti                 Photo Documentation:  See Chart Review, Media Tab this date 2/14/2018 for photo documentation EE C2 # 1- # 8. Implants: * No implants in log *  1. Specimens:   ID Type Source Tests Collected by Time Destination   1 : Left ovary and fallopian tube Frozen Section Ovary  Caity Pruitt MD 2/14/2018 0668 Pathology   2 : morcellated uterus Fresh Uterus  Caity Pruitt MD 2/14/2018 1011 Pathology        Drains: hensley           Complications:  o           Counts: Sponge and needle counts were correct times two.     Prophylaxis:   Ancef     Patient Status Op end: stable

## 2018-02-14 NOTE — PERIOP NOTES
TRANSFER - IN REPORT:    Verbal report received from SANDRA Hillman RN and (name) on Cabrera Moreno  being received from OR(unit) for routine post - op      Report consisted of patients Situation, Background, Assessment and   Recommendations(SBAR). Information from the following report(s) SBAR, OR Summary, Procedure Summary, Intake/Output and MAR was reviewed with the receiving nurse. Opportunity for questions and clarification was provided. Assessment completed upon patients arrival to unit and care assumed.

## 2018-02-14 NOTE — PROGRESS NOTES
TRANSFER - IN REPORT:    Verbal report received from Gustavo Mcdonough on Doc Slaughter  being received from [facility]) for routine post - op      Report consisted of patients Situation, Background, Assessment and   Recommendations(SBAR). Information from the following report(s) SBAR, Kardex, Procedure Summary, Intake/Output, MAR and Recent Results was reviewed with the receiving nurse. Opportunity for questions and clarification was provided. Assessment will be  completed upon patients arrival to unit and care assumed.

## 2018-02-14 NOTE — ANESTHESIA PREPROCEDURE EVALUATION
Anesthetic History     PONV          Review of Systems / Medical History  Patient summary reviewed, nursing notes reviewed and pertinent labs reviewed    Pulmonary          Smoker         Neuro/Psych         Headaches and psychiatric history     Cardiovascular                  Exercise tolerance: >4 METS     GI/Hepatic/Renal     GERD: well controlled           Endo/Other        Arthritis     Other Findings            Physical Exam    Airway  Mallampati: III  TM Distance: 4 - 6 cm  Neck ROM: normal range of motion   Mouth opening: Normal     Cardiovascular  Regular rate and rhythm,  S1 and S2 normal,  no murmur, click, rub, or gallop             Dental  No notable dental hx       Pulmonary  Breath sounds clear to auscultation               Abdominal  GI exam deferred       Other Findings            Anesthetic Plan    ASA: 2  Anesthesia type: general          Induction: Intravenous  Anesthetic plan and risks discussed with: Patient

## 2018-02-14 NOTE — OP NOTES
[facility] [place]  OPERATIVE REPORT    Mirtha Mead  MR#: [id number]  : 1975  ACCOUNT #: [de-identified]   DATE OF SERVICE: 2018    PREOPERATIVE DIAGNOSES:  Refractory dysfunctional uterine bleeding and dysmenorrhea, known polycystic ovary patient and endometriosis, also stabilized Sjogren syndrome and lupus patient. POSTOPERATIVE DIAGNOSES:  Refractory dysfunctional uterine bleeding and dysmenorrhea, known polycystic ovary patient and endometriosis, also stabilized Sjogren syndrome and lupus patient and 4 cm benign hemorrhagic left ovarian cyst, probable endometrioma, stage II pelvic adhesional disease. PROCEDURES PERFORMED: Exam under anesthesia, placement of a Hulka manipulator, diagnostic and operative laser laparoscopy including right ovary plasty, lysis of pelvic adhesions, left salpingo-oophorectomy with frozen section, laparoscopic supracervical hysterectomy. SURGEON: singh hammonds     ASSISTANT: see rns notes     PATHOLOGY PENDING:  LSH, left S and O frozen section benign. DOCUMENTATION FOR OPERATIVES:  Photographs EE-18 series C2 #1-8. TOPICS FOR PHOTOGRAPHS:  1. Right ileocecal junction and normal anteflexed appendix. 2.  Old cholecystectomy site and normal liver. Minimal adherences. 3.  Right ovary with superficial endometriosis, polycystic ovary changes, minimal, noted BTF site and perimesenteric adherences to fossa, to tube and ovary. 4.  A 4 cm hemorrhagic left ovarian cyst and tubal adherences and uterus. 5.  Status postoperative left S and O after frozen section benign and right ovaryplasty with uterus. 6.  After LSH, left S and O, right ovaryplasty and insufflation of saline in bladder with intact bladder.   7.  After Interceed placement, cervical stump and status postoperative LSH, right ovaryplasty, and left S and O.  8.  After Interceed placement, cervical stump and status postoperative LSH, right ovaryplasty, and left S and O. PROPHYLAXIS ON BOARD: Ancef, TagametJacoboylsta. COMPLICATIONS:  None. TRANSFUSIONS:  None. ESTIMATED BLOOD LOSS:  50 mL. ANESTHESIA: gen    SPECIMENS REMOVED: lsh lt s and o     IMPLANTS: o     INDICATIONS:  The patient is a 41-year-old multigravida  female. Please see the dictated summary #[id number] for particulars done on 02/06/2018 at 11:15 a.m. and addended. DESCRIPTION OF PROCEDURE:  The patient was placed under adequate general endotracheal anesthesia. Orogastric tube in place and dorsal lithotomy position in [address] [place] [address] as is routine for the procedure above. Ricci catheter was placed to gravity drainage. Urine was clear. Time out was accomplished. All were in agreement. After draping the patient, we exposed the cervix with retractors. Single-tooth tenaculum grasped the anterior lip of cervix. Uterus sounded to about 11 cm in retroversion, easily accepting the XOXO Kitchenka manipulator, rotated posteriorly and rotated anteriorly and attached to the anterior lip of cervix. This remaining instrument was covered with a sterile drape. The procedure was moved to the anterior abdomen and the outer gloves were changed out on the surgeon. Next, we made a 7/0-TGXX infraumbilical incision. Veress needle was placed intraabdominally along with 3 liters of CO2 at 15 mmHg pressure after test tapping inferiorly free space. 5 mm trocar sheath was placed intraabdominally and under visualization we had free entry. Next, the second, third and fourth puncture sites were placed. First, these were 5 mm trocar sheaths placed, one in suprapubically and right and left of midline. The fourth trocar was two fingerbreadths inside the left iliac crest.  All were done under visualization and without complication. Next, looking back from the right lower port, we changed out the umbilical sheath to a 56.0 mm sheath under direct visualization and changed out the operating system to the 10 mm system. We then changed out the right lower port to a 12.5 mm trocar sheath. Utilizing a blunt probe, we examined the ileocecal junction. The appendix was anteflexed and free and normal.  The old gallbladder removal site was clean. The liver edge was normal.  Minimal adhesions. We turned our attention to the pelvic recess. There were adherences to both ovaries, the right much less than the left. The left was a 4 cm hemorrhagic cyst with firm adherences to the tube. On the right, there was light adherences to the perimesenteric area from the right tube. The ovary was about 1/4 the size of the left ovary, had small polycystic changes and red endometriosis. The uterus itself had minimal superficial endometriosis and had a bladder scar which was minimally scarred. The ureters had good micturition, were easily visible. Photographs were taken. Next, utilizing the laser at 20 boswell/cm2, superpulse therapy, nasal aspirator, titanium itz and irrigation as a backstop we vaporized the superficial endometriosis of the right ovary and stippled the PCO and lysed the perimesenteric adherences, freeing up the right ovary entirely. The left ovary was free and then we started with a left S and O.  Utilizing the Gyrus tripolar system, we desiccated the suspensory ligament of the left ovary proximal to the pampiniform plexus, then eventually the infundibulopelvic ligament, detaching the left tube and the ovary. We evacuated the hemorrhagic cystic material.  It appeared to be benign, as did the cyst wall. Next, we used an Endobag and removed the right ovary and tube in total through the right lower port and sent down for frozen section. It came back as a benign ovarian cyst, old hemorrhagic. We then started with a hysterectomy. We desiccated and incised the suspensory ligament of the right ovary and proximal tube, the round ligament on the right side, the left round ligament.   We took down the anterior broad ligament without difficulty. The ureters had good micturition. Next, we insufflated with 60 mL of saline, staying about 1/4-inch above the most cephalad extension of the insufflation. We demarcated this and incised the pubovesical cervical fascia plane and teased this down to the dimple of the cervix uteri. At each step, we insufflated with saline. We had an intact bladder. Next, we skeletonized the lateral walls of the uterus with a tripolar system, incising after the desiccation. We then desiccated the   insertion of the ascending and descending branch and bilaterally the uterine vessels. Utilizing the gyrus system we incised interfascially. Then, utilizing a double-gloved finger in the vaginal area, taking off the Keepstream manipulator, we delineated the last 1/2-inch of the cervix uteri. We then demarcated this with EndoShears laparoscopically for the transection site. We brought down the cardinal ligament to this site utilizing the gyrus system, coagulating and then incising, then the spatula system transected the mid segment of the cervix, attaching the upper half of the cervix and the uterus. Hemostasis was noted. We used the 5 mm tenaculum instrument to hold the uterus in the right upper quadrant, turned our attention to the pelvic recess. Again, we had good ureter micturition. We then brought down the 140 Endobag for morselization through the right lower port. Once we got this seated in the deep cul-de-sac, we placed the uterus and the upper cervix into the bag. We brought the morcellator down the right lower port and then we morcellated and removed the uterus. There was no debris outside of the bag. We then drove back the bag and took it out the right port as we eugene out the 12.5 mm sheath. Hemostasis was noted. There were no consequential problems with the morselization. There was good ureter micturition and the urine was clear.     Next, no further surgery being necessary, we put Interceed down over the cervical stump, reinsufflated with saline, had an intact bladder. After an estimated blood loss of between 25 and 50 mL, without complication, taking the last photographs, we then allowed escape of the gas, first through the  3rd and the 4th port and then the remaining gas from the subumbilical sheath and removed. The right lower port had already been removed and we closured this prior to removal of the gas utilizing the Charles-Aj system and the U suture of 0 Vicryl. Again, all areas were hemostatic at the end of the procedure. We then closured the remaining incision utilizing 0 Vicryl and then 4-0 Vicryl, fascia, subcuticular stitch x4, injected 20 mL of 0.25% Marcaine without epinephrine for postop anesthetic benefit and covered these incisions with Covaderms. We took out the vaginal sponge stick which kept our pneumoperitoneum after we desiccated the internal canal with the spatula system after transecting the uterus; it was hemostatic. After initial blood loss of less than 50 mL without complication, the Ricci catheter in place along with about 300 mL of clear urine, the patient was taken to the recovery room in stabilized condition. COMPLICATIONS:  None. FINDINGS: As in the operative summary.       MD Dorian Cruz / Lesley Fay  D: 02/14/2018 11:15     T: 02/14/2018 13:46  JOB #: [id number]

## 2018-02-14 NOTE — PROGRESS NOTES
Transferred Pt to room 310, Maria Elena CONNER at bedside. Dressing CDI. Left pt stable.      02/14/18 1246   Vital Signs   Temp 98.2 °F (36.8 °C)   Temp Source Oral   Pulse (Heart Rate) 77   Resp Rate 14   /77   Oxygen Therapy   O2 Sat (%) 100 %   O2 Device Nasal cannula   O2 Flow Rate (L/min) 2 l/min

## 2018-02-14 NOTE — PERIOP NOTES
TRANSFER - IN REPORT:    Verbal report received from Ana M RN(name) on Doc Slaughter  being received from [facility], room 310(unit) for routine progression of care      Report consisted of patients Situation, Background, Assessment and   Recommendations(SBAR). Information from the following report(s) SBAR, OR Summary, Procedure Summary, Intake/Output and MAR was reviewed with the receiving nurse. Opportunity for questions and clarification was provided. Assessment completed upon patients arrival to unit and care assumed.

## 2018-02-14 NOTE — ANESTHESIA POSTPROCEDURE EVALUATION
Post-Anesthesia Evaluation & Assessment    Visit Vitals    /72    Pulse 66    Temp 36.2 °C (97.2 °F)    Resp 15    Ht 5' 7\" (1.702 m)    Wt 91 kg (200 lb 9 oz)    SpO2 100%    BMI 31.41 kg/m2       Nausea/Vomiting: no nausea    Post-operative hydration adequate. Pain score (VAS): 2    Mental status & Level of consciousness: alert and oriented x 3    Neurological status: moves all extremities, sensation grossly intact    Pulmonary status: airway patent, no supplemental oxygen required    Complications related to anesthesia: none    Patient has met all discharge requirements.     Additional comments:        Valencia Molina MD

## 2018-02-14 NOTE — PERIOP NOTES
Family has been updated via telephone and given inpatient room #310. Receiving unit [facility]) notified that SBAR is available for review.

## 2018-02-15 VITALS
HEART RATE: 65 BPM | HEIGHT: 67 IN | SYSTOLIC BLOOD PRESSURE: 96 MMHG | BODY MASS INDEX: 31.48 KG/M2 | DIASTOLIC BLOOD PRESSURE: 51 MMHG | TEMPERATURE: 99.4 F | WEIGHT: 200.56 LBS | RESPIRATION RATE: 16 BRPM | OXYGEN SATURATION: 96 %

## 2018-02-15 LAB
ERYTHROCYTE [DISTWIDTH] IN BLOOD BY AUTOMATED COUNT: 12.5 % (ref 11.6–14.5)
HCT VFR BLD AUTO: 35.1 % (ref 35–45)
HGB BLD-MCNC: 11.8 G/DL (ref 12–16)
MCH RBC QN AUTO: 31.6 PG (ref 24–34)
MCHC RBC AUTO-ENTMCNC: 33.6 G/DL (ref 31–37)
MCV RBC AUTO: 93.9 FL (ref 74–97)
PLATELET # BLD AUTO: 138 K/UL (ref 135–420)
PMV BLD AUTO: 9.4 FL (ref 9.2–11.8)
RBC # BLD AUTO: 3.74 M/UL (ref 4.2–5.3)
WBC # BLD AUTO: 7.2 K/UL (ref 4.6–13.2)

## 2018-02-15 PROCEDURE — 77010033678 HC OXYGEN DAILY

## 2018-02-15 PROCEDURE — 36415 COLL VENOUS BLD VENIPUNCTURE: CPT | Performed by: OBSTETRICS & GYNECOLOGY

## 2018-02-15 PROCEDURE — 85027 COMPLETE CBC AUTOMATED: CPT | Performed by: OBSTETRICS & GYNECOLOGY

## 2018-02-15 PROCEDURE — 90471 IMMUNIZATION ADMIN: CPT

## 2018-02-15 PROCEDURE — 90686 IIV4 VACC NO PRSV 0.5 ML IM: CPT | Performed by: OBSTETRICS & GYNECOLOGY

## 2018-02-15 PROCEDURE — 74011250636 HC RX REV CODE- 250/636: Performed by: OBSTETRICS & GYNECOLOGY

## 2018-02-15 PROCEDURE — 77030027138 HC INCENT SPIROMETER -A

## 2018-02-15 PROCEDURE — 99218 HC RM OBSERVATION: CPT

## 2018-02-15 RX ADMIN — CEFAZOLIN SODIUM 2 G: 2 SOLUTION INTRAVENOUS at 02:44

## 2018-02-15 RX ADMIN — CEFAZOLIN SODIUM 2 G: 2 SOLUTION INTRAVENOUS at 07:59

## 2018-02-15 RX ADMIN — KETOROLAC TROMETHAMINE 30 MG: 30 INJECTION, SOLUTION INTRAMUSCULAR at 07:59

## 2018-02-15 RX ADMIN — INFLUENZA VIRUS VACCINE 0.5 ML: 15; 15; 15; 15 SUSPENSION INTRAMUSCULAR at 09:53

## 2018-02-15 NOTE — DISCHARGE INSTRUCTIONS
Laparoscopically Assisted Vaginal Hysterectomy: What to Expect at [address] [place] [address] can expect to feel better and stronger each day, although you may need pain medicine for a week or two. You may get tired easily or have less energy than usual. This may last for several weeks after surgery. You will probably notice that your belly is swollen and puffy. This is common. The swelling will take several weeks to go down. It may take about 4 to 6 weeks to fully recover. The recovery time may be less for some patients. You may have some light vaginal bleeding. Don't have sex until the doctor says it is okay. Don't douche or put anything into your vagina, such as a tampon, until your doctor says it is okay. It is important to avoid lifting while you are recovering so that you can heal.  This care sheet gives you a general idea about how long it will take for you to recover. But each person recovers at a different pace. Follow the steps below to get better as quickly as possible. How can you care for yourself at home? Activity  ? · Rest when you feel tired. Getting enough sleep will help you recover. ? · Try to walk each day. Start by walking a little more than you did the day before. Bit by bit, increase the amount you walk. Walking boosts blood flow and helps prevent pneumonia and constipation. ? · Avoid lifting anything that would make you strain. This may include heavy grocery bags and milk containers, a heavy briefcase or backpack, cat litter or dog food bags, a vacuum , or a child. ? · Avoid strenuous activities, such as biking, jogging, weight lifting, or aerobic exercise, until your doctor says it is okay. ? · You may shower. Pat the cut (incision) dry. Do not take a bath for the first 2 weeks, or until your doctor tells you it is okay. ? · Ask your doctor when you can drive again. ? · You will probably need to take about 2 weeks off from work.  It depends on the type of work you do and how you feel. ? · Your doctor will tell you when you can have sex again. Diet  ? · You can eat your normal diet. If your stomach is upset, try bland, low-fat foods like plain rice, broiled chicken, toast, and yogurt. ? · Drink plenty of fluids (unless your doctor tells you not to). ? · You may notice that your bowel movements are not regular right after your surgery. This is common. Try to avoid constipation and straining with bowel movements. You may want to take a fiber supplement every day. If you have not had a bowel movement after a couple of days, ask your doctor about taking a mild laxative. Medicines  ? · Your doctor will tell you if and when you can restart your medicines. He or she will also give you instructions about taking any new medicines. ? · If you take blood thinners, such as warfarin (Coumadin), clopidogrel (Plavix), or aspirin, be sure to talk to your doctor. He or she will tell you if and when to start taking those medicines again. Make sure that you understand exactly what your doctor wants you to do. ? · Be safe with medicines. Take pain medicines exactly as directed. ¨ If the doctor gave you a prescription medicine for pain, take it as prescribed. ¨ If you are not taking a prescription pain medicine, ask your doctor if you can take an over-the-counter medicine. ? · If you think your pain medicine is making you sick to your stomach:  ¨ Take your medicine after meals (unless your doctor has told you not to). ¨ Ask your doctor for a different pain medicine. ? · If your doctor prescribed antibiotics, take them as directed. Do not stop taking them just because you feel better. You need to take the full course of antibiotics. Incision care  ? · You may have stitches over the cuts (incisions) the doctor made in your belly. If you have strips of tape on the incisions the doctor made, leave the tape on for a week or until it falls off.  Or follow your doctor's instructions for removing the tape. ? · Wash the area daily with warm, soapy water, and pat it dry. Don't use hydrogen peroxide or alcohol, which can slow healing. You may cover the area with a gauze bandage if it weeps or rubs against clothing. Change the bandage every day. ? · Keep the area clean and dry. Other instructions  ? · You may have some light vaginal bleeding. Wear sanitary pads if needed. Do not douche or use tampons. Follow-up care is a key part of your treatment and safety. Be sure to make and go to all appointments, and call your doctor if you are having problems. It's also a good idea to know your test results and keep a list of the medicines you take. When should you call for help? Call 911 anytime you think you may need emergency care. For example, call if:  ? · You passed out (lost consciousness). ? · You have chest pain, are short of breath, or cough up blood. ?Call your doctor now or seek immediate medical care if:  ? · You have pain that does not get better after you take pain medicine. ? · You cannot pass stools or gas. ? · You have vaginal discharge that has increased in amount or smells bad.   ? · You are sick to your stomach or cannot drink fluids. ? · You have loose stitches, or your incision comes open. ? · Bright red blood has soaked through the bandage over your incision. ? · You have signs of infection, such as:  ¨ Increased pain, swelling, warmth, or redness. ¨ Red streaks leading from the incision. ¨ Pus draining from the incision. ¨ A fever. ? · You have bright red vaginal bleeding that soaks one or more pads in an hour, or you have large clots. ? · You have signs of a blood clot in your leg (called a deep vein thrombosis), such as:  ¨ Pain in your calf, back of the knee, thigh, or groin. ¨ Redness and swelling in your leg. ? Watch closely for changes in your health, and be sure to contact your doctor if you have any problems. Where can you learn more?   Go to http://mikki-crescencio.info/. Enter W290 in the search box to learn more about \"Laparoscopically Assisted Vaginal Hysterectomy: What to Expect at Home. \"  Current as of: October 13, 2016  Content Version: 11.4  © 3948-8842 [employer]wise, [employer]. Care instructions adapted under license by [facility] (which disclaims liability or warranty for this information). If you have questions about a medical condition or this instruction, always ask your healthcare professional. [place] [address] any warranty or liability for your use of this information.

## 2018-02-15 NOTE — PROGRESS NOTES
Shift summary: No acute events. Abdominal pain relieved with PCA of dilaudid. Spoke with Dr. Kyle Foster this AM and received from verbal orders to DC hensley and PCA diluadid. Ambulated hallways, tolerated well. DC'd hensley, due to void.      Patient Vitals for the past 12 hrs:   Temp Pulse Resp BP SpO2   02/15/18 0747 99.4 °F (37.4 °C) 65 16 96/51 96 %   02/15/18 0403 98.5 °F (36.9 °C) 64 15 103/63 98 %   02/14/18 2234 98.3 °F (36.8 °C) 63 15 109/70 100 %

## 2018-02-15 NOTE — DISCHARGE SUMMARY
Discharge Summary    Patient: Enzo Alfonso               Sex: female          DOA: 2/14/2018         YOB: 1975      Age:  40 y.o.        LOS:  LOS: 0 days                Admit Date: 2/14/2018    Discharge Date: 2/15/2018    Admission Diagnoses: DUB, PELVIC PAIN, STAGE 2 ENDOMETRIOSIS  S/P hysterectomy    Discharge Diagnoses:    Problem List as of 2/15/2018  Date Reviewed: 2/15/2018          Codes Class Noted - Resolved    [facility] ICD-10-CM: M35.00  ICD-9-CM: 710.2  2/14/2018 - Present        S/P hysterectomy ICD-10-CM: Z90.710  ICD-9-CM: V88.01  2/14/2018 - Present        RESOLVED: DUB (dysfunctional uterine bleeding) ICD-10-CM: N93.8  ICD-9-CM: 626.8  2/13/2018 - 2/14/2018        RESOLVED: Secondary dysmenorrhea ICD-10-CM: N94.5  ICD-9-CM: 625.3  2/13/2018 - 2/14/2018        RESOLVED: DUB (dysfunctional uterine bleeding) ICD-10-CM: N93.8  ICD-9-CM: 626.8  11/9/2016 - 11/9/2016        RESOLVED: Pelvic pain ICD-10-CM: R10.2  ICD-9-CM: [place]  11/9/2016 - 11/9/2016        RESOLVED: PCO (polycystic ovaries) ICD-10-CM: E28.2  ICD-9-CM: 256.4  11/9/2016 - 11/9/2016              Discharge Condition: Good    Hospital Course: b9    Consults: o    Activity: See surgical instructions    Diet: Regular Diet    Wound Care: As directed    Follow-up: 2 and 6 wks, hosp summary dictated # [id number].

## 2018-02-15 NOTE — PROGRESS NOTES
0725 - Bedside shift change report given to Gene Melendez RN (oncoming nurse) by Franny Low RN (offgoing nurse). Report included the following information SBAR, Kardex, ED Summary, OR Summary, Intake/Output, MAR and Recent Results. Pt resting comfortably in bed. C/o 5/10 r-sided abd pain. Provided fresh bag of ice; requests prn toradol. Ricci was removed at 637hrs this morning; pt has not voided yet. 9571 - Pt has voided s/p Ricci removal.    Summary - Pt comfortable throughout shift. Dressings CDI, ambulates with steady gait, tolerated reg diet for breakfast, pain tolerable and responsive to rx regimen and ice. Tolerated room to RA and DC of PCA pump by night shift nurse well. 1000 - I have reviewed discharge instructions with the patient. The patient verbalized understanding. Pt discharged to home, driven by .

## 2018-02-15 NOTE — DISCHARGE SUMMARY
[address]    Jeannie Mathews  MR#: [id number]  : 1975  ACCOUNT #: [de-identified]   ADMIT DATE: 2018  DISCHARGE DATE:     REASON FOR ROUNDING NOTE:  Postoperative day #1 after LSH, left S and O with complex medical history. Vital signs normal, afebrile, pulse and blood pressure are within normal limits. CBC 4:00 draw was just drawn, results pending. Chief complaints:  None. Patient's case was reviewed including the photographs. The patient is appreciative. Patient is on sips of liquids and some advance to regular diet. No chief complaints. All are being tolerated well and has good micturition. PHYSICAL EXAMINATION:  CHEST:  Clear. CARDIAC:  Regular sinus rhythm without murmur. ABDOMEN:  Soft. Bowel sounds are normal.  Dressings are clean and dry. EXTREMITIES:  Normal.  No vaginal discharge. IMPRESSION:  Resolving ileus and postop day #1 within normal limit recovery. PLAN:  Advancing diet. Patient will start her rheumatoid arthritis and LE medications as is usual at home, as well as her pain medication management at home as per Dr. Patricia Moreno, Dr. Oh Rosales and Dr. Delisa Gambino her internist handling her case at home. The patient is very comfortable with going home on a 23-hour observation. Currently, the patient will be discharged approximately 11:00 to 12:00 a.m. today. Postoperative instructions have been given. The patient has all of her medicines at home, especially her internal medicine as Rx'd by Dr. Delisa Gambino, Dr. Patricia Moreno and Dr. Oh Rosales, will start them this evening as usual.  The patient also was given Percocet 325/5 #20 one p.o. q.4 hours without refill, has those at home for postoperative pain management, but also has tramadol by other physicians for pain management at home, as well as Motrin at home. Patient also has her Flexeril and Xanax medications also at home.   Patient will return to the office in 2 and 6 weeks for her postoperative checkup, those appointments have already been made. Again, postoperative instructions have been given. PATHOLOGY:  Left ovary old hemorrhagic cyst approximately 8-10 cm, b9, LSH specimen is pending.     DISPOSITION:  To pop stable      MD TALIA Ny/  D: 02/15/2018 06:11     T: 02/15/2018 08:42  JOB #: [id number]

## 2018-02-15 NOTE — ROUTINE PROCESS
Bedside and Verbal shift change report given to SANDRA Iraheta (oncoming nurse) by Geetha Spencer RN   (offgoing nurse). Report included the following information SBAR, Kardex, Procedure Summary, Intake/Output, MAR and Recent Results.

## 2018-02-15 NOTE — PROGRESS NOTES
SHIFT SUMMARY  Pt has been stable since admission to the unit at 1300. Abd surgical site CDI with cover derm. Pain managed with PCA. Pt requested to eat but was placed on clear  liquid diet which she did not tolerate. Emesis x2 noted. Zofran given as ordered. No flatus yet BS hypoactive on all quadrants. PCA pump titrated at shift change as ordered. Pt educated on importance of IS use an frequency of use. Pt also encouraged to walk whenever she feels ready. Bedside shift change report given to BEVERLY East RN. Report included the following information SBAR, Kardex, Intake/Output, MAR and Recent Results.

## 2018-02-15 NOTE — PROGRESS NOTES
Progress Note      Patient: David Spaulding               Sex: female          DOA: 2/14/2018         YOB: 1975      Age:  40 y.o.        LOS:  LOS: 0 days               Subjective:     No cc    Objective:      Visit Vitals    /63 (BP 1 Location: Left arm, BP Patient Position: At rest)    Pulse 64    Temp 98.5 °F (36.9 °C)    Resp 15    Ht 5' 7\" (1.702 m)    Wt 91 kg (200 lb 9 oz)    SpO2 98%    Breastfeeding No    BMI 31.41 kg/m2       Physical Exam:  Pertinent items are noted in HPI. Intake and Output:  Current Shift:  02/14 1901 - 02/15 0700  In: 100 [I.V.:100]  Out: 900 [Urine:900]  Last three shifts:  02/13 0701 - 02/14 1900  In: 2040 [P.O.:240; I.V.:1800]  Out: 650 [Urine:600]    Lab/Data Reviewed: All lab results for the last 24 hours reviewed.     Medications Reviewed    Continued hospitalization is not indicated due to pop 20 hrs sable [facility] lt s and o resolving ilius      Assessment/Plan     Active Problems:    Sjogren's disease ([facility] [address] (2/14/2018)      S/P hysterectomy (2/14/2018)        Routine pop care    Home dc to day hosp summary dictated # [id number]

## 2018-02-15 NOTE — PROGRESS NOTES
Chart reviewed. Pt admitted for an elective surgical procedure. Pt is independent. Please encourage ambulation. No plan of care needs identified. Anticipate pt will be medically stable for discharge within the next 24-48 hours. CM available to assist as needed. Discharge Reassessment Plan:  Low Risk    RRAT Score:  1 - 12     Low Risk Care Transition Interventions:  1. Discharge transition plan:  Physician follow up   2. Involved patient/caregiver in assessment, planning, education and implement of intervention. 3. CM daily patient care huddles/interdisciplinary rounds were completed. 4. PCP/Specialist appointment within 5 days made prior to discharge. Date/Time  5. Facilitated transportation and logistics for follow-up appointments. 6. Handoff to [address] [place] [address] Nurse Navigator or PCP practice. Care Management Interventions  PCP Verified by CM: Yes  Mode of Transport at Discharge:  Other (see comment) (family)  Transition of Care Consult (CM Consult): Discharge Planning  Health Maintenance Reviewed: Yes  Current Support Network: Family [facility] [place], Lives Alone  Confirm Follow Up Transport: Self  Discharge Location  Discharge Placement: Home with family assistance'

## (undated) DEVICE — TROCAR ENDOSCP L100MM DIA12MM DIL TIP STBL SL ENDOPATH XCEL

## (undated) DEVICE — FORCEPS SURG 33X3CM CUT W/ CRD 9 PIN PLASMACISION [920005PK] [OLYMPUS SURGICAL TECH - GYRUS]

## (undated) DEVICE — FORCEPS BPLR DIA5MM MACRO JAW LAP ENDOPATH

## (undated) DEVICE — COVADERM PLUS: Brand: DEROYAL

## (undated) DEVICE — SOL IRRIGATION INJ NACL 0.9% 500ML BTL

## (undated) DEVICE — ENDOCUT SCISSOR TIP, DISPOSABLE: Brand: RENEW

## (undated) DEVICE — 40580 - THE PINK PAD - ADVANCED TRENDELENBURG POSITIONING KIT: Brand: 40580 - THE PINK PAD - ADVANCED TRENDELENBURG POSITIONING KIT

## (undated) DEVICE — MAYO STAND COVER: Brand: CONVERTORS

## (undated) DEVICE — TROCAR ENDOSCP SHFT L100MM DIA5MM DIL TIP ENDOPATH XCEL

## (undated) DEVICE — SUT VCRL + 0 36IN UR6 VIO --

## (undated) DEVICE — DISPOSABLE SUCTION/IRRIGATOR TUBE SET WITH TIP: Brand: AHTO

## (undated) DEVICE — Z DUP USE 2526982 TUBING IRRIG L90IN DIA0.19IN 1 LD W/ CYL DRIP CHMBR CYSTO

## (undated) DEVICE — SUTURE VCRL SZ 4-0 L27IN ABSRB UD L19MM PS-2 3/8 CIR PRIM J426H

## (undated) DEVICE — TROCAR ENDOSCP L100MM DIA11MM DIL TIP STBL SL DISP ENDOPATH

## (undated) DEVICE — Device

## (undated) DEVICE — STERILE POLYISOPRENE POWDER-FREE SURGICAL GLOVES: Brand: PROTEXIS

## (undated) DEVICE — DEVON™ KNEE AND BODY STRAP 60" X 3" (1.5 M X 7.6 CM): Brand: DEVON

## (undated) DEVICE — NEEDLE HYPO 25GA L1.5IN BLU POLYPR HUB S STL REG BVL STR

## (undated) DEVICE — KENDALL SCD EXPRESS SLEEVES, KNEE LENGTH, MEDIUM: Brand: KENDALL SCD

## (undated) DEVICE — 3L THIN WALL CAN: Brand: CRD

## (undated) DEVICE — NDL SPNE QNCKE 18GX3.5IN LF --

## (undated) DEVICE — STERILE POLYISOPRENE POWDER-FREE SURGICAL GLOVES WITH EMOLLIENT COATING: Brand: PROTEXIS

## (undated) DEVICE — BARRIER TISS ADH ABSRB 3X4IN -- GYNECARE INTERCEED

## (undated) DEVICE — (D)SYR 10ML 1/5ML GRAD NSAF -- PKGING CHANGE USE ITEM 338027

## (undated) DEVICE — TUBING FLTR PLUME AWAY EVAC W/ SUCT DEV DISP PUREVIEW

## (undated) DEVICE — 3M™ STERI-DRAPE™ INCISE DRAPE 1050 (60CM X 45CM): Brand: STERI-DRAPE™

## (undated) DEVICE — SOLUTION LACTATED RINGERS INJECTION USP

## (undated) DEVICE — TISSUE RETRIEVAL SYSTEM: Brand: INZII RETRIEVAL SYSTEM

## (undated) DEVICE — BLADE TNGE REG 6IN WOOD STRL -- CONVERT TO ITEM 153408

## (undated) DEVICE — GYN LAPAROSCOPY: Brand: MEDLINE INDUSTRIES, INC.

## (undated) DEVICE — (D)APPLICATOR COT TIP 6IN STRL -- DISC BY MFR USE ITEM 325856

## (undated) DEVICE — ECO-SAC

## (undated) DEVICE — MORCELLATOR ENDO 15MM OBT DISPOSABLEXCISE